# Patient Record
Sex: FEMALE | Race: WHITE | NOT HISPANIC OR LATINO | Employment: FULL TIME
[De-identification: names, ages, dates, MRNs, and addresses within clinical notes are randomized per-mention and may not be internally consistent; named-entity substitution may affect disease eponyms.]

---

## 2017-10-08 ENCOUNTER — HEALTH MAINTENANCE LETTER (OUTPATIENT)
Age: 32
End: 2017-10-08

## 2020-09-17 ENCOUNTER — OFFICE VISIT (OUTPATIENT)
Dept: URGENT CARE | Facility: URGENT CARE | Age: 35
End: 2020-09-17
Payer: COMMERCIAL

## 2020-09-17 ENCOUNTER — RESULTS ONLY (OUTPATIENT)
Dept: LAB | Age: 35
End: 2020-09-17

## 2020-09-17 DIAGNOSIS — Z53.9 ERRONEOUS ENCOUNTER--DISREGARD: Primary | ICD-10-CM

## 2020-09-18 LAB
SARS-COV-2 RNA SPEC QL NAA+PROBE: NOT DETECTED
SPECIMEN SOURCE: NORMAL

## 2020-11-19 ENCOUNTER — E-VISIT (OUTPATIENT)
Dept: URGENT CARE | Facility: URGENT CARE | Age: 35
End: 2020-11-19
Payer: COMMERCIAL

## 2020-11-19 DIAGNOSIS — Z53.9 ERRONEOUS ENCOUNTER--DISREGARD: Primary | ICD-10-CM

## 2020-11-19 NOTE — PATIENT INSTRUCTIONS
Dear Cyndie Felder,    We are sorry you are not feeling well. Based on the responses you provided, it is recommended that you be seen in-person in urgent care so we can better evaluate your symptoms. Please click here to find the nearest urgent care location to you.   You will not be charged for this Visit. Thank you for trusting us with your care.    Jc Rodriguez PA-C    Dear Cyndie Felder,    We are sorry you are not feeling well. Based on the responses you provided, it is recommended that you be seen in-person in urgent care so we can better evaluate your symptoms. Please click here to find the nearest urgent care location to you.   You will not be charged for this Visit. Thank you for trusting us with your care.    You may need to have blood work and stool cultures with 10-14 days of symptoms of diarrhea, nausea.   Jc Rodriguez PA-C

## 2020-11-20 ENCOUNTER — OFFICE VISIT (OUTPATIENT)
Dept: URGENT CARE | Facility: URGENT CARE | Age: 35
End: 2020-11-20
Payer: COMMERCIAL

## 2020-11-20 VITALS
TEMPERATURE: 98.4 F | RESPIRATION RATE: 20 BRPM | OXYGEN SATURATION: 98 % | DIASTOLIC BLOOD PRESSURE: 87 MMHG | HEART RATE: 60 BPM | SYSTOLIC BLOOD PRESSURE: 121 MMHG

## 2020-11-20 DIAGNOSIS — R11.0 NAUSEA: Primary | ICD-10-CM

## 2020-11-20 DIAGNOSIS — R19.5 LOOSE STOOLS: ICD-10-CM

## 2020-11-20 LAB
ALBUMIN SERPL-MCNC: 3.6 G/DL (ref 3.4–5)
ALBUMIN UR-MCNC: NEGATIVE MG/DL
ALP SERPL-CCNC: 97 U/L (ref 40–150)
ALT SERPL W P-5'-P-CCNC: 26 U/L (ref 0–50)
ANION GAP SERPL CALCULATED.3IONS-SCNC: 1 MMOL/L (ref 3–14)
APPEARANCE UR: CLEAR
AST SERPL W P-5'-P-CCNC: 17 U/L (ref 0–45)
BILIRUB SERPL-MCNC: 0.2 MG/DL (ref 0.2–1.3)
BILIRUB UR QL STRIP: NEGATIVE
BUN SERPL-MCNC: 21 MG/DL (ref 7–30)
CALCIUM SERPL-MCNC: 9.5 MG/DL (ref 8.5–10.1)
CHLORIDE SERPL-SCNC: 105 MMOL/L (ref 94–109)
CO2 SERPL-SCNC: 30 MMOL/L (ref 20–32)
COLOR UR AUTO: YELLOW
CREAT SERPL-MCNC: 1.11 MG/DL (ref 0.52–1.04)
GFR SERPL CREATININE-BSD FRML MDRD: 63 ML/MIN/{1.73_M2}
GLUCOSE SERPL-MCNC: 85 MG/DL (ref 70–99)
GLUCOSE UR STRIP-MCNC: NEGATIVE MG/DL
HGB UR QL STRIP: NEGATIVE
KETONES UR STRIP-MCNC: NEGATIVE MG/DL
LEUKOCYTE ESTERASE UR QL STRIP: NEGATIVE
NITRATE UR QL: NEGATIVE
PH UR STRIP: 6 PH (ref 5–7)
POTASSIUM SERPL-SCNC: 3.9 MMOL/L (ref 3.4–5.3)
PROT SERPL-MCNC: 7.5 G/DL (ref 6.8–8.8)
SODIUM SERPL-SCNC: 136 MMOL/L (ref 133–144)
SOURCE: NORMAL
SP GR UR STRIP: 1.01 (ref 1–1.03)
UROBILINOGEN UR STRIP-ACNC: 0.2 EU/DL (ref 0.2–1)

## 2020-11-20 PROCEDURE — 36415 COLL VENOUS BLD VENIPUNCTURE: CPT | Performed by: FAMILY MEDICINE

## 2020-11-20 PROCEDURE — U0003 INFECTIOUS AGENT DETECTION BY NUCLEIC ACID (DNA OR RNA); SEVERE ACUTE RESPIRATORY SYNDROME CORONAVIRUS 2 (SARS-COV-2) (CORONAVIRUS DISEASE [COVID-19]), AMPLIFIED PROBE TECHNIQUE, MAKING USE OF HIGH THROUGHPUT TECHNOLOGIES AS DESCRIBED BY CMS-2020-01-R: HCPCS | Performed by: FAMILY MEDICINE

## 2020-11-20 PROCEDURE — 99204 OFFICE O/P NEW MOD 45 MIN: CPT | Performed by: FAMILY MEDICINE

## 2020-11-20 PROCEDURE — 80053 COMPREHEN METABOLIC PANEL: CPT | Performed by: FAMILY MEDICINE

## 2020-11-20 PROCEDURE — 81003 URINALYSIS AUTO W/O SCOPE: CPT | Performed by: FAMILY MEDICINE

## 2020-11-20 RX ORDER — SERTRALINE HYDROCHLORIDE 100 MG/1
100 TABLET, FILM COATED ORAL
COMMUNITY
Start: 2020-10-08 | End: 2022-08-06

## 2020-11-20 RX ORDER — BUPROPION HYDROCHLORIDE 300 MG/1
300 TABLET ORAL
COMMUNITY
Start: 2020-10-08 | End: 2022-08-06

## 2020-11-20 SDOH — HEALTH STABILITY: MENTAL HEALTH: HOW OFTEN DO YOU HAVE 6 OR MORE DRINKS ON ONE OCCASION?: NEVER

## 2020-11-20 SDOH — HEALTH STABILITY: MENTAL HEALTH: HOW MANY STANDARD DRINKS CONTAINING ALCOHOL DO YOU HAVE ON A TYPICAL DAY?: NOT ASKED

## 2020-11-20 SDOH — HEALTH STABILITY: MENTAL HEALTH: HOW OFTEN DO YOU HAVE A DRINK CONTAINING ALCOHOL?: NEVER

## 2020-11-20 NOTE — PROGRESS NOTES
Chief Complaint   Patient presents with     Urgent Care     Headache, nauseous, Diarrhea for two weeks , stuffy nose     SUBJECTIVE:   Cyndie Felder is a 35 year old female presenting with a chief complaint of nausea and also stuffy nose and diarrhea for 2 weeks   She denies any acute fever or chills or abd pain or cough or chest congestion and chest heaviness  She is an established patient of [facility].  Onset of symptoms was 2 week(s) ago.  Course of illness is worsening.    Severity moderate, she works at the [employer]  Describes stool as being watery not having foul smell   Current and Associated symptoms: nausea and diarrhea  Treatment measures tried include None tried.  Predisposing factors include None.    No past medical history on file.  Current Outpatient Medications   Medication Sig Dispense Refill     buPROPion (WELLBUTRIN XL) 300 MG 24 hr tablet Take 300 mg by mouth       sertraline (ZOLOFT) 100 MG tablet Take 100 mg by mouth       Social History     Tobacco Use     Smoking status: Never Smoker     Smokeless tobacco: Never Used   Substance Use Topics     Alcohol use: Never     Frequency: Never     Binge frequency: Never     No family history on file.      ROS:    10 point ROS of systems including Constitutional, Eyes, Respiratory, Cardiovascular,  Genitourinary, Integumentary, Muscularskeletal, Psychiatric were all negative except for pertinent positives noted in my HPI         OBJECTIVE:  /87   Pulse 60   Temp 98.4  F (36.9  C)   Resp 20   LMP 10/30/2020 (Approximate)   SpO2 98%   GENERAL APPEARANCE: healthy, alert and no distress  EYES: EOMI,  PERRL, conjunctiva clear  HENT: ear canals and TM's normal.  Nose and mouth without ulcers, erythema or lesions  NP swab for COVID was   NECK: supple, nontender, no lymphadenopathy  RESP: lungs clear to auscultation - no rales, rhonchi or wheezes  CV: regular rates and rhythm, normal S1 S2, no murmur noted  ABDOMEN:  soft, nontender, no HSM  or masses and bowel sounds normal  SKIN: no suspicious lesions or rashes  PSYCH: mentation appears normal  Physical Exam      X-Ray was not done.    Medical Decision Making:    Differential Diagnosis:  Gastro: viral gastroenteritis /covid/colitis /gall stones       ASSESSMENT:  Cyndie was seen today for urgent care.    Diagnoses and all orders for this visit:    Nausea  -     *UA reflex to Microscopic and Culture ([facility] (except [place] and [place])  -     Comprehensive metabolic panel (BMP + Alb, Alk Phos, ALT, AST, Total. Bili, TP)  -     Symptomatic COVID-19 Virus (Coronavirus) by PCR; Future    Loose stools  -     Enteric Bacteria and Virus Panel by [facility] Stool; Future  -     Clostridium difficile Toxin B PCR; Future        Results for orders placed or performed in visit on 11/20/20   *UA reflex to Microscopic and Culture ([facility] (except [place] and [place])     Status: None    Specimen: Midstream Urine   Result Value Ref [place]    Color Urine Yellow     Appearance Urine Clear     Glucose Urine Negative NEG^Negative mg/dL    Bilirubin Urine Negative NEG^Negative    Ketones Urine Negative NEG^Negative mg/dL    Specific Gravity Urine 1.015 1.003 - 1.035    Blood Urine Negative NEG^Negative    pH Urine 6.0 5.0 - 7.0 pH    Protein Albumin Urine Negative NEG^Negative mg/dL    Urobilinogen Urine 0.2 0.2 - 1.0 EU/dL    Nitrite Urine Negative NEG^Negative    Leukocyte Esterase Urine Negative NEG^Negative    Source Midstream Urine    Comprehensive metabolic panel (BMP + Alb, Alk Phos, ALT, AST, Total. Bili, TP)     Status: Abnormal   Result Value Ref [place]    Sodium 136 133 - 144 mmol/L    Potassium 3.9 3.4 - 5.3 mmol/L    Chloride 105 94 - 109 mmol/L    Carbon Dioxide 30 20 - 32 mmol/L    Anion Gap 1 (L) 3 - 14 mmol/L    Glucose 85 70 - 99 mg/dL    Urea Nitrogen 21 7 - 30 mg/dL    Creatinine 1.11 (H) 0.52 - 1.04 mg/dL    GFR Estimate 63 >60 mL/min/[1.73_m2]    GFR Estimate If  Black 73 >60 mL/min/[1.73_m2]    Calcium 9.5 8.5 - 10.1 mg/dL    Bilirubin Total 0.2 0.2 - 1.3 mg/dL    Albumin 3.6 3.4 - 5.0 g/dL    Protein Total 7.5 6.8 - 8.8 g/dL    Alkaline Phosphatase 97 40 - 150 U/L    ALT 26 0 - 50 U/L    AST 17 0 - 45 U/L       PLAN:  Tylenol, Fluids and Rest  Discussed with pt about the symptoms   covid swab obtained   Discussed with pt to stay quarantine till covid test result is back   Discussed result with patient   Continue on symptomatic treatment   Follow up if  symptoms fail to improve or worsens   Pt understood and agreed with plan     See orders in Epic      Miranda Middleton MD

## 2020-11-21 LAB
SARS-COV-2 RNA SPEC QL NAA+PROBE: NOT DETECTED
SPECIMEN SOURCE: NORMAL

## 2020-12-11 ENCOUNTER — E-VISIT (OUTPATIENT)
Dept: URGENT CARE | Facility: URGENT CARE | Age: 35
End: 2020-12-11
Payer: COMMERCIAL

## 2020-12-11 ENCOUNTER — E-VISIT (OUTPATIENT)
Dept: PEDIATRICS | Facility: CLINIC | Age: 35
End: 2020-12-11

## 2020-12-11 DIAGNOSIS — Z20.822 CLOSE EXPOSURE TO 2019 NOVEL CORONAVIRUS: Primary | ICD-10-CM

## 2020-12-11 DIAGNOSIS — Z20.822 EXPOSURE TO COVID-19 VIRUS: Primary | ICD-10-CM

## 2020-12-11 PROCEDURE — 99421 OL DIG E/M SVC 5-10 MIN: CPT | Performed by: PHYSICIAN ASSISTANT

## 2020-12-11 PROCEDURE — 99207 PR NO CHARGE LOS: CPT | Performed by: PREVENTIVE MEDICINE

## 2020-12-11 NOTE — PATIENT INSTRUCTIONS
Thank you for choosing us for your care. Given your symptoms, I would like you to do a lab-only visit to determine what is causing them.  I have placed the orders.  Please schedule an appointment with the lab right here in [place], or call [phone].  I will let you know when the results are back and next steps to take.

## 2020-12-11 NOTE — PATIENT INSTRUCTIONS
Dear Cyndie Felder,    Based on your exposure to COVID-19 (coronavirus), we would like to test you for this virus. I have placed an order for this test.    For all employees or close contacts (except [place] and [place] - see below), go to your [employer] home page and scroll down to the section that says  You have an appointment that needs to be scheduled  and click the large green button that says  Schedule Now  and follow the steps to find the next available opening.     If you are unable to complete these steps or if you cannot find any available times, please call [phone] to schedule employee testing.       [place] employees or close contacts, please call [phone].   [place] ([employer]) employees or close contacts call [phone].      If you know you have had close contact with someone who tested positive, you should be quarantined for 14 days after this exposure. You should stay in quarantine for the14 days even if the covid test is negative.     Quarantine means:  Stay home and away from others. Don't go to school or anywhere else. Generally quarantine means staying home from work but there are some exceptions to this. Please contact your workplace.  No hugging, kissing or shaking hands.  Don't let anyone visit.  Cover your mouth and nose with a mask, tissue or washcloth to avoid spreading germs.  Wash your hands and face often. Use soap and water.    What are the symptoms of COVID-19?  The most common symptoms are cough, fever and trouble breathing. Less common symptoms include headache, body aches, fatigue (feeling very tired), chills, sore throat, stuffy or runny nose, diarrhea (loose poop), loss of taste or smell, belly pain, and nausea or vomiting (feeling sick to your stomach or throwing up).  After 14 days, if you have still don't have symptoms, you likely don't have this virus.  If you develop symptoms, follow these guidelines.  If you're normally healthy: Please start another  eVisit.  If you have a serious health problem (like cancer, heart failure, an organ transplant or kidney disease): Call your specialty clinic. Let them know that you might have COVID-19.    When it's time for your COVID test:  Stay at least 6 feet away from others. (If someone will drive you to your test, stay in the backseat, as far away from the  as you can.)  Cover your mouth and nose with a mask, tissue or washcloth.  Go straight to the testing site. Don't make any stops on the way there or back.    Please note  Patients in these groups are at risk for severe illness due to COVID-19:    People 65 years and older    People who live in a nursing home or long-term care facility    People with chronic disease (lung, heart, cancer, diabetes, kidney, liver, immunologic)    People who have a weakened immune system, including those who:  o Are in cancer treatment  o Take medicine that weakens the immune system, such as corticosteroids  o Had a bone marrow or organ transplant  o Have an immune deficiency  o Have poorly controlled HIV or AIDS  o Are obese (body mass index of 40 or higher)  o Smoke regularly    Where can I get more information?  [facility] - About COVID-19: [email or web address]  CDC - What to Do If You're Sick: www.cdc.gov/coronavirus/2019-ncov/about/steps-when-sick.html  CDC - Ending Home Isolation: www.cdc.gov/coronavirus/2019-ncov/hcp/disposition-in-home-patients.html  CDC - Caring for Someone: www.cdc.gov/coronavirus/2019-ncov/if-you-are-sick/care-for-someone.html  [facility] - Interim Guidance for Hospital Discharge to Home: www.health.[facility].mn.us/diseases/coronavirus/hcp/hospdischarge.pdf  [facility] clinical trials (COVID-19 research studies): clinicalaffairs.[facility]/n-clinical-trials  Below are the COVID-19 hotlines at the [place] Department of Health ([facility]). Interpreters are available.  For health questions: Call 674-625-0061 or 1-663.806.5926 (7 a.m. to 7  p.m.)  For questions about schools and childcare: Call 223-206-2915 or 1-608.375.6503 (7 a.m. to 7 p.m.)    Dear Cyndie Felder,    Based on your exposure to COVID-19 (coronavirus), we would like to test you for this virus. I have placed an order for this test.    For all employees or close contacts (except [place] and [employer] - see below), go to your [employer] home page and scroll down to the section that says  You have an appointment that needs to be scheduled  and click the large green button that says  Schedule Now  and follow the steps to find the next available opening.     If you are unable to complete these steps or if you cannot find any available times, please call [phone] to schedule employee testing.       [place] employees or close contacts, please call [phone].   [place] ([employer]) employees or close contacts call [phone].      If you know you have had close contact with someone who tested positive, you should be quarantined for 14 days after this exposure. You should stay in quarantine for the14 days even if the covid test is negative.     Quarantine means:  Stay home and away from others. Don't go to school or anywhere else. Generally quarantine means staying home from work but there are some exceptions to this. Please contact your workplace.  No hugging, kissing or shaking hands.  Don't let anyone visit.  Cover your mouth and nose with a mask, tissue or washcloth to avoid spreading germs.  Wash your hands and face often. Use soap and water.    What are the symptoms of COVID-19?  The most common symptoms are cough, fever and trouble breathing. Less common symptoms include headache, body aches, fatigue (feeling very tired), chills, sore throat, stuffy or runny nose, diarrhea (loose poop), loss of taste or smell, belly pain, and nausea or vomiting (feeling sick to your stomach or throwing up).  After 14 days, if you have still don't have symptoms, you likely don't have this virus.  If  you develop symptoms, follow these guidelines.  If you're normally healthy: Please start another eVisit.  If you have a serious health problem (like cancer, heart failure, an organ transplant or kidney disease): Call your specialty clinic. Let them know that you might have COVID-19.    When it's time for your COVID test:  Stay at least 6 feet away from others. (If someone will drive you to your test, stay in the backseat, as far away from the  as you can.)  Cover your mouth and nose with a mask, tissue or washcloth.  Go straight to the testing site. Don't make any stops on the way there or back.    Please note  Patients in these groups are at risk for severe illness due to COVID-19:    People 65 years and older    People who live in a nursing home or long-term care facility    People with chronic disease (lung, heart, cancer, diabetes, kidney, liver, immunologic)    People who have a weakened immune system, including those who:  o Are in cancer treatment  o Take medicine that weakens the immune system, such as corticosteroids  o Had a bone marrow or organ transplant  o Have an immune deficiency  o Have poorly controlled HIV or AIDS  o Are obese (body mass index of 40 or higher)  o Smoke regularly    Where can I get more information?  [facility] - About COVID-19: [email or web address]  CDC - What to Do If You're Sick: www.cdc.gov/coronavirus/2019-ncov/about/steps-when-sick.html  CDC - Ending Home Isolation: www.cdc.gov/coronavirus/2019-ncov/hcp/disposition-in-home-patients.html  CDC - Caring for Someone: www.cdc.gov/coronavirus/2019-ncov/if-you-are-sick/care-for-someone.html  [facility] - Interim Guidance for Hospital Discharge to Home: www.health.[facility].[place].us/diseases/coronavirus/hcp/hospdischarge.pdf  [facility] clinical trials (COVID-19 research studies): clinicalaffairs.[facility]/umn-clinical-trials  Below are the COVID-19 hotlines at the [place] Department of Health ([facility]).  Interpreters are available.  For health questions: Call 082-205-5231 or 1-858.787.6493 (7 a.m. to 7 p.m.)  For questions about schools and childcare: Call 642-129-4911 or 1-353.845.9982 (7 a.m. to 7 p.m.)

## 2020-12-13 ENCOUNTER — HEALTH MAINTENANCE LETTER (OUTPATIENT)
Age: 35
End: 2020-12-13

## 2021-01-15 ENCOUNTER — VIRTUAL VISIT (OUTPATIENT)
Dept: SURGERY | Facility: CLINIC | Age: 36
End: 2021-01-15
Payer: COMMERCIAL

## 2021-01-15 VITALS — HEIGHT: 64 IN | BODY MASS INDEX: 34.66 KG/M2 | WEIGHT: 203 LBS

## 2021-01-15 DIAGNOSIS — E66.812 CLASS 2 SEVERE OBESITY DUE TO EXCESS CALORIES WITH SERIOUS COMORBIDITY AND BODY MASS INDEX (BMI) OF 35.0 TO 35.9 IN ADULT (H): Primary | ICD-10-CM

## 2021-01-15 DIAGNOSIS — G47.9 SLEEP DISTURBANCE: ICD-10-CM

## 2021-01-15 DIAGNOSIS — E66.01 CLASS 2 SEVERE OBESITY DUE TO EXCESS CALORIES WITH SERIOUS COMORBIDITY AND BODY MASS INDEX (BMI) OF 35.0 TO 35.9 IN ADULT (H): Primary | ICD-10-CM

## 2021-01-15 PROCEDURE — 99215 OFFICE O/P EST HI 40 MIN: CPT | Mod: 95 | Performed by: PHYSICIAN ASSISTANT

## 2021-01-15 ASSESSMENT — MIFFLIN-ST. JEOR: SCORE: 1582.86
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
## 2021-01-28 ENCOUNTER — E-VISIT (OUTPATIENT)
Dept: URGENT CARE | Facility: URGENT CARE | Age: 36
End: 2021-01-28
Payer: COMMERCIAL

## 2021-01-28 ENCOUNTER — TELEPHONE (OUTPATIENT)
Dept: FAMILY MEDICINE | Facility: CLINIC | Age: 36
End: 2021-01-28

## 2021-01-28 DIAGNOSIS — Z20.822 SUSPECTED COVID-19 VIRUS INFECTION: Primary | ICD-10-CM

## 2021-01-28 PROCEDURE — 99421 OL DIG E/M SVC 5-10 MIN: CPT | Performed by: PHYSICIAN ASSISTANT

## 2021-01-28 NOTE — TELEPHONE ENCOUNTER
Pt on [facility] schedule today for symptomatica covid test.  [facility] does not do symptomatic covid testing.  Called pt and cancelled lab apt here today.  Gave her [phone] opt 7 to get scheduled for symptomatic covid lab.  Petra Rehman RN

## 2021-01-28 NOTE — PATIENT INSTRUCTIONS
Dear Cyndie Felder,    Your symptoms show that you may have coronavirus (COVID-19). This illness can cause fever, cough and trouble breathing. Many people get a mild case and get better on their own. Some people can get very sick.    Will I be tested for COVID-19?  We would like to test you for Covid-19 virus. I have placed orders for this test.     For all employees or close contacts (except [employer] [place] and [employer] - see below), go to your [employer] home page and scroll down to the section that says  You have an appointment that needs to be scheduled  and click the large green button that says  Schedule Now  and follow the steps to find the next available opening.     If you are unable to complete these steps or if you cannot find any available times, please call [phone] to schedule employee testing.     [facility] [place] employees or close contacts, please call [phone].   [place] ([employer]) employees or close contacts call [phone].    Return to work/school/ guidance:  Please let your workplace manager and staffing office know when your quarantine ends     We can t give you an exact date as it depends on the above. You can calculate this on your own or work with your manager/staffing office to calculate this. (For example if you were exposed on 10/4, you would have to quarantine for 14 full days. That would be through 10/18. You could return on 10/19.)      If you receive a positive COVID-19 test result, follow the guidance of the those who are giving you the results. Usually the return to work is 10 (or in some cases 20 days from symptom onset.) If you work at [employer], you must also be cleared by Employee Occupational Health and Safety to return to work.        If you receive a negative COVID-19 test result and did not have a high risk exposure to someone with a known positive COVID-19 test, you can return to work once you're free of fever for 24 hours without fever-reducing medication and  your symptoms are improving or resolved.      If you receive a negative COVID-19 test and If you had a high risk exposure to someone who has tested positive for COVID-19 then you can return to work 14 days after your last contact with the positive individual    Note: If you have ongoing exposure to the covid positive person, this quarantine period may be more than 14 days. (For example, if you are continued to be exposed to your child who tested positive and cannot isolate from them, then the quarantine of 7-14 days can't start until your child is no longer contagious. This is typically 10 days from onset of the child's symptoms. So the total duration may be 17-24 days in this case.)    Sign up for [employer].   We know it's scary to hear that you might have COVID-19. We want to track your symptoms to make sure you're okay over the next 2 weeks. Please look for an email from [employer]--this is a free, online program that we'll use to keep in touch. To sign up, follow the link in the email you will receive. Learn more at [email or web address]    How can I take care of myself?    Get lots of rest. Drink extra fluids (unless a doctor has told you not to)    Take Tylenol (acetaminophen) or ibuprofen for fever or pain. If you have liver or kidney problems, ask your family doctor if it's okay to take Tylenol o ibuprofen    If you have other health problems (like cancer, heart failure, an organ transplant or severe kidney disease): Call your specialty clinic if you don't feel better in the next 2 days.    Know when to call 911. Emergency warning signs include:  o Trouble breathing or shortness of breath  o Pain or pressure in the chest that doesn't go away  o Feeling confused like you haven't felt before, or not being able to wake up  o Bluish-colored lips or face    Where can I get more information?   [employer] - About COVID-19:   [email or web address]    CDC - What to Do If You're Sick:    www.cdc.gov/coronavirus/2019-ncov/about/steps-when-sick.html    January 28, 2021  RE:  Cyndie Felder                                                                                                                  [address] [place] [address]      To whom it may concern:    I evaluated Cyndie Felder on January 28, 2021. Cyndie Felder should be excused from work/school.     They should let their workplace manager and staffing office know when their quarantine ends.    We can not give an exact date as it depends on the information below. They can calculate this on their own or work with their manager/staffing office to calculate this. (For example if they were exposed on 10/04, they would have to quarantine for 14 full days. That would be through 10/18. They could return on 10/19.)    Quarantine Guidelines:      If patient receives a positive COVID-19 test result, they should follow the guidance of those who are giving the results. Usually the return to work is 10 (or in some cases 20 days from symptom onset.) If they work at [employer], they must be cleared by Employee Occupational Health and Safety to return to work.        If patient receives a negative COVID-19 test result and did not have a high risk exposure to someone with a known positive COVID-19 test, they can return to work once they're free of fever for 24 hours without fever-reducing medication and their symptoms are improving or resolved.      If patient receives a negative COVID-19 test and if they had a high risk exposure to someone who has tested positive for COVID-19 then they can return to work 14 days after their last contact with the positive individual    Note: If there is ongoing exposure to the covid positive person, this quarantine period may be longer than 14 days. (For example, if they are continually exposed to their child, who tested positive and cannot isolate from them, then the quarantine of 7-14 days can't start until  their child is no longer contagious. This is typically 10 days from onset to the child's symptoms. So the total duration may be 17-24 days in this case.)     Sincerely,  Jc Rodriguez PA-C

## 2021-01-30 ENCOUNTER — OFFICE VISIT (OUTPATIENT)
Dept: URGENT CARE | Facility: URGENT CARE | Age: 36
End: 2021-01-30
Attending: PHYSICIAN ASSISTANT
Payer: COMMERCIAL

## 2021-01-30 DIAGNOSIS — Z20.822 CLOSE EXPOSURE TO 2019 NOVEL CORONAVIRUS: ICD-10-CM

## 2021-01-30 DIAGNOSIS — Z20.822 SUSPECTED COVID-19 VIRUS INFECTION: ICD-10-CM

## 2021-01-30 LAB
SARS-COV-2 RNA RESP QL NAA+PROBE: NORMAL
SPECIMEN SOURCE: NORMAL

## 2021-01-30 PROCEDURE — U0003 INFECTIOUS AGENT DETECTION BY NUCLEIC ACID (DNA OR RNA); SEVERE ACUTE RESPIRATORY SYNDROME CORONAVIRUS 2 (SARS-COV-2) (CORONAVIRUS DISEASE [COVID-19]), AMPLIFIED PROBE TECHNIQUE, MAKING USE OF HIGH THROUGHPUT TECHNOLOGIES AS DESCRIBED BY CMS-2020-01-R: HCPCS | Performed by: PHYSICIAN ASSISTANT

## 2021-01-30 PROCEDURE — U0005 INFEC AGEN DETEC AMPLI PROBE: HCPCS | Performed by: PHYSICIAN ASSISTANT

## 2021-01-31 LAB
LABORATORY COMMENT REPORT: NORMAL
SARS-COV-2 RNA RESP QL NAA+PROBE: NEGATIVE
SPECIMEN SOURCE: NORMAL

## 2021-09-26 ENCOUNTER — HEALTH MAINTENANCE LETTER (OUTPATIENT)
Age: 36
End: 2021-09-26

## 2022-01-16 ENCOUNTER — HEALTH MAINTENANCE LETTER (OUTPATIENT)
Age: 37
End: 2022-01-16

## 2022-08-04 ENCOUNTER — HOSPITAL ENCOUNTER (OUTPATIENT)
Facility: CLINIC | Age: 37
Setting detail: OBSERVATION
Discharge: HOME | End: 2022-08-06
Attending: EMERGENCY MEDICINE | Admitting: EMERGENCY MEDICINE
Payer: COMMERCIAL

## 2022-08-04 ENCOUNTER — MEDICAL CORRESPONDENCE (OUTPATIENT)
Dept: HEALTH INFORMATION MANAGEMENT | Facility: CLINIC | Age: 37
End: 2022-08-04

## 2022-08-04 DIAGNOSIS — F10.220: ICD-10-CM

## 2022-08-04 DIAGNOSIS — T14.91XA SUICIDE ATTEMPT (H): ICD-10-CM

## 2022-08-04 DIAGNOSIS — F33.2 SEVERE EPISODE OF RECURRENT MAJOR DEPRESSIVE DISORDER, WITHOUT PSYCHOTIC FEATURES (H): ICD-10-CM

## 2022-08-04 LAB
ALBUMIN SERPL-MCNC: 3.3 G/DL (ref 3.4–5)
ALP SERPL-CCNC: 106 U/L (ref 40–150)
ALT SERPL W P-5'-P-CCNC: 37 U/L (ref 0–50)
ANION GAP SERPL CALCULATED.3IONS-SCNC: 8 MMOL/L (ref 3–14)
APAP SERPL-MCNC: <2 MG/L (ref 10–30)
AST SERPL W P-5'-P-CCNC: 28 U/L (ref 0–45)
BASOPHILS # BLD AUTO: 0.1 10E3/UL (ref 0–0.2)
BASOPHILS NFR BLD AUTO: 1 %
BILIRUB SERPL-MCNC: 0.2 MG/DL (ref 0.2–1.3)
BUN SERPL-MCNC: 14 MG/DL (ref 7–30)
CALCIUM SERPL-MCNC: 8.7 MG/DL (ref 8.5–10.1)
CHLORIDE BLD-SCNC: 107 MMOL/L (ref 94–109)
CO2 SERPL-SCNC: 26 MMOL/L (ref 20–32)
COHGB MFR BLD: 0.9 % (ref 0–2)
CREAT SERPL-MCNC: 0.93 MG/DL (ref 0.52–1.04)
EOSINOPHIL # BLD AUTO: 0.1 10E3/UL (ref 0–0.7)
EOSINOPHIL NFR BLD AUTO: 1 %
ERYTHROCYTE [DISTWIDTH] IN BLOOD BY AUTOMATED COUNT: 13.6 % (ref 10–15)
ETHANOL SERPL-MCNC: 0.24 G/DL
GFR SERPL CREATININE-BSD FRML MDRD: 80 ML/MIN/1.73M2
GLUCOSE BLD-MCNC: 154 MG/DL (ref 70–99)
HCG SER QL IA.RAPID: NEGATIVE
HCT VFR BLD AUTO: 47.7 % (ref 35–47)
HGB BLD-MCNC: 15.6 G/DL (ref 11.7–15.7)
IMM GRANULOCYTES # BLD: 0 10E3/UL
IMM GRANULOCYTES NFR BLD: 0 %
LYMPHOCYTES # BLD AUTO: 3.1 10E3/UL (ref 0.8–5.3)
LYMPHOCYTES NFR BLD AUTO: 31 %
MCH RBC QN AUTO: 29.7 PG (ref 26.5–33)
MCHC RBC AUTO-ENTMCNC: 32.7 G/DL (ref 31.5–36.5)
MCV RBC AUTO: 91 FL (ref 78–100)
MONOCYTES # BLD AUTO: 0.5 10E3/UL (ref 0–1.3)
MONOCYTES NFR BLD AUTO: 5 %
NEUTROPHILS # BLD AUTO: 6.2 10E3/UL (ref 1.6–8.3)
NEUTROPHILS NFR BLD AUTO: 62 %
NRBC # BLD AUTO: 0 10E3/UL
NRBC BLD AUTO-RTO: 0 /100
PLATELET # BLD AUTO: 444 10E3/UL (ref 150–450)
POTASSIUM BLD-SCNC: 3.6 MMOL/L (ref 3.4–5.3)
PROT SERPL-MCNC: 7.3 G/DL (ref 6.8–8.8)
RBC # BLD AUTO: 5.26 10E6/UL (ref 3.8–5.2)
SALICYLATES SERPL-MCNC: <2 MG/DL
SARS-COV-2 RNA RESP QL NAA+PROBE: NEGATIVE
SODIUM SERPL-SCNC: 141 MMOL/L (ref 133–144)
WBC # BLD AUTO: 10 10E3/UL (ref 4–11)

## 2022-08-04 PROCEDURE — 80053 COMPREHEN METABOLIC PANEL: CPT | Performed by: EMERGENCY MEDICINE

## 2022-08-04 PROCEDURE — C9803 HOPD COVID-19 SPEC COLLECT: HCPCS

## 2022-08-04 PROCEDURE — 99285 EMERGENCY DEPT VISIT HI MDM: CPT | Mod: 25

## 2022-08-04 PROCEDURE — 80143 DRUG ASSAY ACETAMINOPHEN: CPT | Performed by: EMERGENCY MEDICINE

## 2022-08-04 PROCEDURE — 85025 COMPLETE CBC W/AUTO DIFF WBC: CPT | Performed by: EMERGENCY MEDICINE

## 2022-08-04 PROCEDURE — 82077 ASSAY SPEC XCP UR&BREATH IA: CPT | Performed by: EMERGENCY MEDICINE

## 2022-08-04 PROCEDURE — U0003 INFECTIOUS AGENT DETECTION BY NUCLEIC ACID (DNA OR RNA); SEVERE ACUTE RESPIRATORY SYNDROME CORONAVIRUS 2 (SARS-COV-2) (CORONAVIRUS DISEASE [COVID-19]), AMPLIFIED PROBE TECHNIQUE, MAKING USE OF HIGH THROUGHPUT TECHNOLOGIES AS DESCRIBED BY CMS-2020-01-R: HCPCS | Performed by: EMERGENCY MEDICINE

## 2022-08-04 PROCEDURE — 36415 COLL VENOUS BLD VENIPUNCTURE: CPT | Performed by: EMERGENCY MEDICINE

## 2022-08-04 PROCEDURE — 84702 CHORIONIC GONADOTROPIN TEST: CPT

## 2022-08-04 PROCEDURE — 80179 DRUG ASSAY SALICYLATE: CPT | Performed by: EMERGENCY MEDICINE

## 2022-08-04 PROCEDURE — 82375 ASSAY CARBOXYHB QUANT: CPT | Performed by: EMERGENCY MEDICINE

## 2022-08-04 ASSESSMENT — ENCOUNTER SYMPTOMS
DIARRHEA: 0
DYSPHORIC MOOD: 1
FEVER: 0
HALLUCINATIONS: 0
RHINORRHEA: 0
COUGH: 0

## 2022-08-05 PROBLEM — F33.2 SEVERE EPISODE OF RECURRENT MAJOR DEPRESSIVE DISORDER, WITHOUT PSYCHOTIC FEATURES (H): Status: ACTIVE | Noted: 2022-08-05

## 2022-08-05 PROBLEM — T14.91XA SUICIDE ATTEMPT (H): Status: ACTIVE | Noted: 2022-08-05

## 2022-08-05 PROBLEM — F10.220: Status: ACTIVE | Noted: 2022-08-05

## 2022-08-05 PROCEDURE — 90791 PSYCH DIAGNOSTIC EVALUATION: CPT

## 2022-08-05 PROCEDURE — 99220 PR INITIAL OBSERVATION CARE,LEVEL III: CPT | Mod: AI | Performed by: NURSE PRACTITIONER

## 2022-08-05 PROCEDURE — 250N000013 HC RX MED GY IP 250 OP 250 PS 637: Performed by: STUDENT IN AN ORGANIZED HEALTH CARE EDUCATION/TRAINING PROGRAM

## 2022-08-05 PROCEDURE — G0378 HOSPITAL OBSERVATION PER HR: HCPCS

## 2022-08-05 PROCEDURE — 250N000013 HC RX MED GY IP 250 OP 250 PS 637: Performed by: NURSE PRACTITIONER

## 2022-08-05 RX ORDER — NALTREXONE HYDROCHLORIDE 50 MG/1
50 TABLET, FILM COATED ORAL DAILY
Status: DISCONTINUED | OUTPATIENT
Start: 2022-08-06 | End: 2022-08-06 | Stop reason: HOSPADM

## 2022-08-05 RX ORDER — TRAZODONE HYDROCHLORIDE 50 MG/1
50 TABLET, FILM COATED ORAL
Status: DISCONTINUED | OUTPATIENT
Start: 2022-08-05 | End: 2022-08-06 | Stop reason: HOSPADM

## 2022-08-05 RX ORDER — ACETAMINOPHEN 325 MG/1
650 TABLET ORAL EVERY 6 HOURS PRN
Status: DISCONTINUED | OUTPATIENT
Start: 2022-08-05 | End: 2022-08-06 | Stop reason: HOSPADM

## 2022-08-05 RX ORDER — FLUOXETINE 10 MG/1
10 CAPSULE ORAL DAILY
Status: DISCONTINUED | OUTPATIENT
Start: 2022-08-05 | End: 2022-08-06 | Stop reason: HOSPADM

## 2022-08-05 RX ORDER — GABAPENTIN 100 MG/1
100 CAPSULE ORAL 3 TIMES DAILY
Status: DISCONTINUED | OUTPATIENT
Start: 2022-08-05 | End: 2022-08-06 | Stop reason: HOSPADM

## 2022-08-05 RX ORDER — SERTRALINE HYDROCHLORIDE 100 MG/1
100 TABLET, FILM COATED ORAL DAILY
Status: DISCONTINUED | OUTPATIENT
Start: 2022-08-05 | End: 2022-08-06 | Stop reason: HOSPADM

## 2022-08-05 RX ORDER — HYDROXYZINE HYDROCHLORIDE 50 MG/1
50 TABLET, FILM COATED ORAL EVERY 6 HOURS PRN
Status: DISCONTINUED | OUTPATIENT
Start: 2022-08-05 | End: 2022-08-06 | Stop reason: HOSPADM

## 2022-08-05 RX ORDER — BUPROPION HYDROCHLORIDE 150 MG/1
150 TABLET ORAL DAILY
Status: DISCONTINUED | OUTPATIENT
Start: 2022-08-05 | End: 2022-08-06 | Stop reason: HOSPADM

## 2022-08-05 RX ADMIN — ACETAMINOPHEN 650 MG: 325 TABLET ORAL at 20:26

## 2022-08-05 RX ADMIN — GABAPENTIN 100 MG: 100 CAPSULE ORAL at 13:54

## 2022-08-05 RX ADMIN — SERTRALINE HYDROCHLORIDE 100 MG: 100 TABLET ORAL at 13:54

## 2022-08-05 RX ADMIN — BUPROPION HYDROCHLORIDE 150 MG: 150 TABLET, FILM COATED, EXTENDED RELEASE ORAL at 13:54

## 2022-08-05 RX ADMIN — ACETAMINOPHEN 650 MG: 325 TABLET ORAL at 10:46

## 2022-08-05 RX ADMIN — FLUOXETINE 10 MG: 10 CAPSULE ORAL at 13:54

## 2022-08-05 RX ADMIN — TRAZODONE HYDROCHLORIDE 50 MG: 50 TABLET ORAL at 20:26

## 2022-08-05 RX ADMIN — GABAPENTIN 100 MG: 100 CAPSULE ORAL at 20:26

## 2022-08-05 NOTE — ED NOTES
Patient was cooperative and changed clothes into scrubs and placed her belongings in the bag provided.  We secured her clothes and phone in bag and placed in secured locker.  Pt asked to go to the bathroom, we asked her to provide a urine sample, she declined stated she didn't want to.  After using the bathroom, patient walked back into her room without issues or concerns.
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
## 2022-08-05 NOTE — PROGRESS NOTES
Pt denies any SI, HI or hallucinations. Pt inquired about coping skills and states she will ask the [facility] tomorrow. Pt spent time sleeping in the sensory room then spent time in the lounge sitting on the recliner watching tv. Pt pleasant and cooperative.

## 2022-08-05 NOTE — ED PROVIDER NOTES
"  History     Chief Complaint:  Suicidal    The history is provided by the patient and the EMS personnel.      Cyndie Felder is a 37 year old female with a history of depression and anxiety who presents to the emergency department for evaluation of suicidal ideation. The patient recently stopped taking her depression medication and seeing her psychiatrist. Tonight, she notes she was feeling especially sad so she drank. She denied any suicidal ideation or attempt. However, the police department and EMS reports the patient was found in her car running in a closed garage. It was also noted that she had sent text messages to her brother stating \"I am going to kill myself. I want to die\". She also sent pictures to her brother of her sitting in the car in the garage. He called EMS, and they transported her to the emergency department. Here, she denies any suicidal ideation or homicidal thoughts. She also notes she feels emotionally and physically safe at home. She also denied any auditory or visual hallucinations. Cyndie denied any fever, cough, rhinorrhea, chest pain, or diarrhea.     Review of Systems   Constitutional: Negative for fever.   HENT: Negative for rhinorrhea.    Eyes: Negative for visual disturbance.   Respiratory: Negative for cough.    Cardiovascular: Negative for chest pain.   Gastrointestinal: Negative for diarrhea.   Psychiatric/Behavioral: Positive for dysphoric mood. Negative for hallucinations and suicidal ideas.   All other systems reviewed and are negative.    Allergies:  Latex    Medications:    buPROPion (WELLBUTRIN XL) 300 MG 24 hr tablet  sertraline (ZOLOFT) 100 MG tablet  pyridoxine (VITAMIN B-6) 50 MG tablet     traZODone (DESYREL) 50 MG tablet     naltrexone (REVIA) 50 MG tablet     metFORMIN XR (GLUCOPHAGE XR) 500 MG 24 hour release tablet       Past Medical History:    Anxiety   Depressive disorder   Diabetes    Obesity    Family History:    Alcohol Abuse (father)    High Cholesterol " "(father)    Hypertension (father)    Clotting Disorder (mother)    Osteoporosis (mother)    substance abuse (mother)      Social History:  Primary care provider: Ilene Joe  The patient presents to the ED via EMS.     Physical Exam     Patient Vitals for the past 24 hrs:   BP Temp Temp src Pulse Resp SpO2 Height Weight   08/04/22 2141 (!) 138/93 98.1  F (36.7  C) Temporal 98 18 96 % 1.6 m (5' 3\") 97.5 kg (215 lb)     Physical Exam  Constitutional: Well developed, nontox appearance, clinically intoxicated  Head: Atraumatic.   Neck:  no stridor  Eyes: no scleral icterus  Cardiovascular: RRR, 2+ bilat radial pulses  Pulmonary/Chest: nml resp effort  Ext: Warm, well perfused, no edema  Neurological: A&O, symmetric facies, moves ext x4  Skin: Skin is warm and dry.   Psychiatric: Denies SI/HI, denies auditory or visual hallucinations, tearful  Nursing note and vitals reviewed.    Emergency Department Course     Laboratory:  Labs Ordered and Resulted from Time of ED Arrival to Time of ED Departure   COMPREHENSIVE METABOLIC PANEL - Abnormal       Result Value    Sodium 141      Potassium 3.6      Chloride 107      Carbon Dioxide (CO2) 26      Anion Gap 8      Urea Nitrogen 14      Creatinine 0.93      Calcium 8.7      Glucose 154 (*)     Alkaline Phosphatase 106      AST 28      ALT 37      Protein Total 7.3      Albumin 3.3 (*)     Bilirubin Total 0.2      GFR Estimate 80     ETHYL ALCOHOL LEVEL - Abnormal    Alcohol ethyl 0.24 (*)    ACETAMINOPHEN LEVEL - Abnormal    Acetaminophen <2 (*)    CBC WITH PLATELETS AND DIFFERENTIAL - Abnormal    WBC Count 10.0      RBC Count 5.26 (*)     Hemoglobin 15.6      Hematocrit 47.7 (*)     MCV 91      MCH 29.7      MCHC 32.7      RDW 13.6      Platelet Count 444      % Neutrophils 62      % Lymphocytes 31      % Monocytes 5      % Eosinophils 1      % Basophils 1      % Immature Granulocytes 0      NRBCs per 100 WBC 0      Absolute Neutrophils 6.2      Absolute Lymphocytes 3.1      " Absolute Monocytes 0.5      Absolute Eosinophils 0.1      Absolute Basophils 0.1      Absolute Immature Granulocytes 0.0      Absolute NRBCs 0.0     COVID-19 VIRUS (CORONAVIRUS) BY PCR - Normal    SARS CoV2 PCR Negative     CARBON MONOXIDE - Normal    Carbon Monoxide 0.9     SALICYLATE LEVEL - Normal    Salicylate <2     ISTAT HCG QUALITATIVE PREGNANCY POCT - Normal    HCG Qualitative POCT Negative       Emergency Department Course:    Mental Health Risk Assessment      PSS-3    Date and Time Over the past 2 weeks have you felt down, depressed, or hopeless? Over the past 2 weeks have you had thoughts of killing yourself? Have you ever attempted to kill yourself? When did this last happen? User   22 2133 yes yes yes within the last 24 hours (including today) SS      C-SSRS ([place])    Date and Time Q1 Wished to be Dead (Past Month) Q2 Suicidal Thoughts (Past Month) Q3 Suicidal Thought Method Q4 Suicidal Intent without Specific Plan Q5 Suicide Intent with Specific Plan Q6 Suicide Behavior (Lifetime) Within the Past 3 Months? RETIRED: Level of Risk per Screen Screening Not Complete User   226 yes yes yes -- yes yes -- -- -- SS                  Reviewed:  I reviewed nursing notes, vitals, past medical history and Care Everywhere    Assessments:   I obtained history and examined the patient as noted above.       Disposition:   Patient transferred empath unit    Impression & Plan      Medical Decision Makin year old female presenting w/ reported suicide attempt, alcohol intoxication     DDx includes psychiatric disorder NOS, personality disorder NOS, alcohol abuse, alcohol dependence, major depressive disorder, generalized anxiety disorder, adjustment disorder.  Labs significant for elevated alcohol level, carbon oxide level within safe range.  Given the patient's history and symptomatology, I think would be appropriate to have them evaluated in the empath unit for further evaluation  management.  Patient was transferred to empath unit in stable condition after labs resulted.  She was counseled on the plan prior to transfer empath unit.     Critical Care time:  none    Covid-19  Cyndie Felder was evaluated during a global COVID-19 pandemic, which necessitated consideration that the patient might be at risk for infection with the SARS-CoV-2 virus that causes COVID-19.   Applicable protocols for evaluation were followed during the patient's care.   COVID-19 was considered as part of the patient's evaluation.    Diagnosis:    ICD-10-CM    1. Suicide attempt (H)  T14.91XA    2. Alcoholic intoxication without complication (H)  F10.920    3. Depression, unspecified depression type  F32.A      Discharge Medications:  New Prescriptions    No medications on file     Scribe Disclosure:  Jeannette BORJAS, am serving as a scribe at 10:01 PM on 8/4/2022 to document services personally performed by Nikita Ruvalcaba MD based on my observations and the provider's statements to me.      Nikita Ruvalcaba MD  08/05/22 0103

## 2022-08-05 NOTE — PROGRESS NOTES
"37 year old female with history of depression received from ED due to recently stopping her medications, she notes she was feeling especially sad so she drank. She denied any suicidal ideation or attempt. However, the police department and EMS reports the patient was found in her car running in a closed garage. It was also noted that she had sent text messages to her brother stating \"I am going to kill myself. I want to die\".  Reports she is not suicidal. denies SI/HI. Nursing and risk assessments completed. Assessments reviewed with LMHP and physician. Video monitoring in progress, patient informed.  Admission information reviewed with patient. Patient given a tour of [facility] and instructions on using the facility. Questions regarding [facility] addressed. Pt search completed and belongings inventoried.      "

## 2022-08-05 NOTE — ED NOTES
Patient requested larger scrub pants that were provided to her and she also asked for a glass of water.  Provided patient with a pitcher of ice water and a glass of ice water.

## 2022-08-05 NOTE — ED NOTES
Bed: ED18  Expected date:   Expected time:   Means of arrival:   Comments:  Hems 429/ 37f/ Suicide attempt/ restrained/ Hold ETA 2115

## 2022-08-05 NOTE — ED TRIAGE NOTES
Patient BIBA. Patient sent text messages to brother saying that she wanted to kill herself. Sent pictures of herself in the garage with her car running. Brother called PD who pulled her out of the garage. Children were in the house with no other adult. Patient appears intoxicated. For EMS patient began more agitated and EMS needed to restrain the patient. Patient arrives on peace hold.

## 2022-08-05 NOTE — ED NOTES
"Writer was talking to patient about what happened tonight. Patient states, \"I wasn't trying to kill myself. I just get depressed sometimes.\"  "

## 2022-08-05 NOTE — ED PROVIDER NOTES
"[facility] Unit - Initial Psychiatric Observation Note  [facility] Emergency Department  Observation Initiation Date: Aug 4, 2022    Cyndie Felder MRN: [id number]   Age: 37 year old YOB: 1985     History     Chief Complaint   Patient presents with     Suicidal     HPI  Cyndie Felder is a 37 year old female with a past history notable for major depressive disorder, alcoholism, and anxiety who presents to the ED post-suicide attempt by carbon monoxide poisoning.  Patient was evaluated by the ED provider, who medically cleared patient to transfer to [facility] for psychiatric assessment, this is reviewed along with all pertinent labs and tests performed. Patient placed on observation status. No acute issues overnight. She is being psychiatrically assessed today, 08/05/22.    On examination, patient is tearful, expressing remorse for her actions. She states she wants to be alive for her family yet in the moment yesterday, due to alcohol intoxication, with a blood alcohol level of 0.24 on arrival to the ED, she acted on her suicidal thoughts. She describes feeling sad prior to drinking. She identifies depressive symptoms increasing over the past two months due to increased stress with financial issues. She states she is unable to afford her medication or attend therapy due to the cost.  She has not been consistently taking her medication. Her alcohol consumption has increased during this time with reports of drinking large amounts at a time with periods of sobriety between episodes. She identifies being an \"alcoholic\" and attended CD treatment in November 2021. She stopped attending  AA meetings.    We discuss the seriousness of her suicide attempt as patient claims she regrets it and would like to go home.  Refreshed patient's memory of the documented facts, that she left her children in the house and sat in a running car with the garage door closed.  Patient states she was in the car for about 3 minutes before " sending a text message to her brother with a picture of her sitting in the car.  Patient notes she figured he would call for help, so part of her was reaching out for support. She states she wanted to call and talk to her brother earlier in the day and feels if she would have reached out, she most likely would not have acted on her suicidal thoughts.      We discussed her medications. She has been taking Wellbutrin and Zoloft for a couple years. She feels the Zoloft may not be alleviating her symptoms of anxiety and is interested in pursuing alternative medications.  Patient is requesting to return home today, although given the severity of her suicide act, at least observation status is recommended. It was explained to her she would meet criteria for an involuntary hold, thus she chose to stay voluntary and be reassessed tomorrow to determine further disposition.    Past Medical History  Past Medical History:   Diagnosis Date     Anxiety      Depressive disorder      Diabetes (H)     gestational diabetes     No past surgical history on file.  buPROPion (WELLBUTRIN XL) 300 MG 24 hr tablet  sertraline (ZOLOFT) 100 MG tablet      Allergies   Allergen Reactions     Latex Hives     PN: Converted from LW Latex Sensitivity Flag       Family History  Family History   Problem Relation Age of Onset     Hypertension Father      Social History   Social History     Tobacco Use     Smoking status: Never Smoker     Smokeless tobacco: Never Used   Substance Use Topics     Alcohol use: Not Currently     Drug use: Never      Past medical history, past surgical history, medications, allergies, family history, and social history were reviewed with the patient. No additional pertinent items.       Review of Systems  A complete review of systems was performed with pertinent positives and negatives noted in the HPI, and all other systems negative.    Physical Examination   BP: (!) 138/93  Pulse: 98  Temp: 98.1  F (36.7  C)  Resp:  "18  Height: 160 cm (5' 3\")  Weight: 97.5 kg (215 lb)  SpO2: 96 %    Physical Exam  General: Appears stated age.   Neuro: Alert and fully oriented. Extremities appear to demonstrate normal strength on visual inspection.   Integumentary/Skin: no rash visualized, normal color    Psychiatric Examination   Appearance: awake, alert, fatigued and mild distress  Attitude:  cooperative  Eye Contact:  good  Mood:  anxious, depressed and better  Affect:  dysphoric, overwhelmed and remorseful  Speech:  clear, coherent  Psychomotor Behavior:  no evidence of tardive dyskinesia, dystonia, or tics  Thought Process:  logical, linear and goal oriented  Associations:  no loose associations  Thought Content:  no evidence of suicidal ideation or homicidal ideation and no evidence of psychotic thought  Insight:  fair  Judgement:  intact  Oriented to:  time, person, and place  Attention Span and Concentration:  intact  Recent and Remote Memory:  intact  Language: able to name/identify objects without impairment  Fund of Knowledge: intact with awareness of current and past events    ED Course        Labs Ordered and Resulted from Time of ED Arrival to Time of ED Departure   COMPREHENSIVE METABOLIC PANEL - Abnormal       Result Value    Sodium 141      Potassium 3.6      Chloride 107      Carbon Dioxide (CO2) 26      Anion Gap 8      Urea Nitrogen 14      Creatinine 0.93      Calcium 8.7      Glucose 154 (*)     Alkaline Phosphatase 106      AST 28      ALT 37      Protein Total 7.3      Albumin 3.3 (*)     Bilirubin Total 0.2      GFR Estimate 80     ETHYL ALCOHOL LEVEL - Abnormal    Alcohol ethyl 0.24 (*)    ACETAMINOPHEN LEVEL - Abnormal    Acetaminophen <2 (*)    CBC WITH PLATELETS AND DIFFERENTIAL - Abnormal    WBC Count 10.0      RBC Count 5.26 (*)     Hemoglobin 15.6      Hematocrit 47.7 (*)     MCV 91      MCH 29.7      MCHC 32.7      RDW 13.6      Platelet Count 444      % Neutrophils 62      % Lymphocytes 31      % Monocytes 5   "    % Eosinophils 1      % Basophils 1      % Immature Granulocytes 0      NRBCs per 100 WBC 0      Absolute Neutrophils 6.2      Absolute Lymphocytes 3.1      Absolute Monocytes 0.5      Absolute Eosinophils 0.1      Absolute Basophils 0.1      Absolute Immature Granulocytes 0.0      Absolute NRBCs 0.0     COVID-19 VIRUS (CORONAVIRUS) BY PCR - Normal    SARS CoV2 PCR Negative     CARBON MONOXIDE - Normal    Carbon Monoxide 0.9     SALICYLATE LEVEL - Normal    Salicylate <2     ISTAT HCG QUALITATIVE PREGNANCY POCT - Normal    HCG Qualitative POCT Negative         Assessments & Plan (with Medical Decision Making)   Patient presenting with suicide attempt by carbon monoxide  poisoning interrpted by EMS after she sent a text message to her brother stating what she was doing in the context of MDD further complicated by alcohol consumption.    ursing notes reviewed noting no acute issues.     I have reviewed the assessment completed by the [facility].     During the observation period, the patient did not require medications for agitation, and did not require restraints/seclusion for patient and/or provider safety.     The patient was found to have a psychiatric condition that would benefit from an observation stay in the emergency department for further psychiatric stabilization and/or coordination of a safe disposition. The observation plan includes serial assessments of psychiatric condition, potential administration of medications if indicated, further disposition pending the patient's psychiatric course during the monitoring period.     Preliminary diagnosis:    ICD-10-CM    1. Suicide attempt (H)  T14.91XA    2. Severe episode of recurrent major depressive disorder, without psychotic features (H)  F33.2    3. Acute alcoholic intoxication in alcoholism with blood level of 0.08 to 0.29 without complication (H)  F10.220         Treatment Plan:  -Observation status for further crisis stabilization and medication  management.  -Do not anticipate patient will experience symptoms of withdrawal, thus will not order CIWA protocol.  -Medication changes include:  Decrease Wellbutrin XL from 300 mg daily to 150 mg daily as she wonders if the higher dose makes her anxiety worse.  -Plan to wean off Zoloft while starting Prozac 10 mg daily with plan to increase to 20 mg at time of discharge.  -Gabapentin 100 mg TID for anxiety  -Trazodone 50 mg at HS PRN sleep  -Atarax 25-50 mg PRN anxiety  -Restart Naltrexone 50 mg tomorrow for alcohol abuse.  -Medication education provided this visit includes, rationale for medication, importance of compliance, medication interactions, and common side effects. Patient agreeable.  -Interested in resuming therapy and psychiatry and AA. Is not seeking further CD treatment.    --  GENEVA Rob CNP   [facility] EMERGENCY DEPT  [facility] Unit  8/4/2022        Saira Charlton APRN CNP  08/05/22 [id number]       Saira Charlton APRN CNP  08/20/22 3599

## 2022-08-05 NOTE — CONSULTS
8/4/2022  Cyndie Felder 1985     Diagnostic Evaluation Consultation  Crisis Assessment    Patient was assessed: In Person  Patient location: [place]  Was a release of information signed: No. Reason: pt refused      Referral Data and Chief Complaint  Cyndie is a 37 year old, who uses she/her pronouns, and presents to the ED via EMS. Patient is referred to the ED by family/friends. Patient is presenting to the ED for the following concerns: suicidal ideation      Informed Consent and Assessment Methods     Patient is her own guardian. Writer met with patient and explained the crisis assessment process, including applicable information disclosures and limits to confidentiality, assessed understanding of the process, and obtained consent to proceed with the assessment. Patient was observed to be able to participate in the assessment as evidenced by alert and oriented. Assessment methods included conducting a formal interview with patient, review of medical records, collaboration with medical staff, and obtaining relevant collateral information from family and community providers when available..     Over the course of this crisis assessment provided reassurance, offered validation and engaged patient in problem solving and disposition planning. Patient's response to interventions was appropriate, pt was able to calm self during assessment      Summary of Patient Situation    - What is the patient's presentation and history of crisis/emergency services:    Cyndie Felder is a 37 year old female with a history of depression and anxiety who presents to the emergency department for evaluation of suicidal ideation. The patient recently stopped taking her depression medication and seeing her psychiatrist. Tonight, she notes she was feeling especially sad so she drank. She denied any suicidal ideation or attempt. However, the police department and EMS reports the patient was found in her car running in a closed  "farrah. It was also noted that she had sent text messages to her brother stating \"I am going to kill myself. I want to die\". She also sent pictures to her brother of her sitting in the car in the garage. He called EMS, and they transported her to the emergency department. Here, she denies any suicidal ideation or homicidal thoughts. She also notes she feels emotionally and physically safe at home. She also denied any auditory or visual hallucinations.     Cyndie denies the circumstances for her ED admission was a suicide attempt. She states that she reached out to family, 'they got worried and here I am.' She states that her brother called 911. Pt states that she had been drinking all day and got sad. Pt states that a stressor for her currently is money. Pt states that she sent a text because she wanted to talk with someone, 'my family or . I made a mistake.' Pt states that she stopped taking her medications 4 days ago. 'I kept forgetting.'     Pt became irritable with the assessment and being asked questions. She stated that she did not want to answer anymore questions and didn't know why she couldn't leave. Pt continued to state that her actions were not a suicide attempt. Pt then stated that she was disappointed in herself because she's here. She stated that she would answer the rest of the questions. Pt answered most questions, however, appeared to be vague and/or did not want to answer. She stated that she would finish so that she wouldn't have to in the hospital longer.     -Frequency and prevalance of chief complaint: unknown      -Clinical description of symptoms: pt stated that she did not want to share. Pt appeared to be sad and tearful throughout assessment.     -Duration of the current crisis: 'I guess today'     -Resolution attempts - coping skills - towards mitigating life threatening symptoms: 'going to bed or walk'        Brief Psychosocial History    - Current living situation:  and kids " (13, 12, 6). Pt states she thinks her  is home with the kids right now. When asked if her kids were at home while she was in the car in the garage, pt would not answer.       - Brief family history: 'no'      - School/ Work Functioning:  'no'     - Employment and income source - financial concerns: pt states there are some financial concerns     -  status: unknown      - Hobbies: unknown     - Supports: 'next question'     - Relevant legal issues: NA    - Cultural, Jehovah's witness, or spiritual influences on mental health care: unknown     Significant Clinical History    - History of mental health (and) substance use crisis: depression and anxiety, ADHD     Pt states that she started drinking again a month ago. She states that she had been sober for 6 or 7 months and then drinking again. Pt states that she usually has been drinking a couple of beers each day, however, she states that she had the day off today and ended up drinking more.      - Symptom and diagnosis history: pt states she's not able to provide symptoms that she experiences related to depression and anxiety       - Historic treatment team: pt states that she hasn't seen a therapist in a while due to financial concerns. She states that her PCP manages her medications.     - Past hospitalization, commitments, [place], treatment programs, and other therapuetic efforts: pt denies    - Relevant trauma history: 'no'       Collateral Information    The following information was received from Tru whose relationship to the patient is spouse. Information was obtained via phone. Their phone number is [phone] and they last had contact with patient on 8/4/22.    What happened today: Tru states that he works nights (leaves at 4pm) and was gone when everything happened. He states that pt wants him to get on day shift, which he states is understandable, but he can't switch until the school year starts. He states that his daughter  called him at work and said  were at their home. Tru states that he is aware that pt was texting her brother that she was threatening suicide and he called the . Tru confirms that his kids were at home during this.    What is different about patient's functioning: 'well she is supposed to work today and she's not here'    Concern about alcohol/drug use: 'not really, I wasn't here.' Writer then asked if he had concerns with any substance use recently. Tru states that she went to treatment a year ago because her drinking got out of control. He states that 'when her birthday hit, she let her hair down.'     What do you think the patient needs: 'a hug.' Tru states that he knows she wants him to be home at night. He states that this incident was isolated, she hasn't done anything like this before and he doesn't know why she did it.     Has patient made comments about wanting to kill themselves/others:  No     If d/c is recommended, can they take part in safety/aftercare planning: Yes, Tru states that he feels comfortable with her coming home. He does not have concerns regarding suicidal thoughts/plan     Other information:     Risk Assessment  ESS-6  1.a. Over the past 2 weeks, have you had thoughts of killing yourself? No pt denies, however, per EMS pt was found at her home in her car (running) with the garage closed.  1.b. Have you ever attempted to kill yourself and, if yes, when did this last happen? No pt denies, however, per EMS pt was found at her home in her car (running) with the garage closed.  2. Recent or current suicide plan? No   3. Recent or current intent to act on ideation? No  4. Lifetime psychiatric hospitalization? No  5. Pattern of excessive substance use? Yes  6. Current irritability, agitation, or aggression? Yes  Scoring note: BOTH 1a and 1b must be yes for it to score 1 point, if both are not yes it is zero. All others are 1 point per number. If all questions 1a/1b - 6  are no, risk is negligible. If one of 1a/1b is yes, then risk is mild. If either question 2 or 3, but not both, is yes, then risk is automatically moderate regardless of total score. If both 2 and 3 are yes, risk is automatically high regardless of total score.      Score: 2, high risk      Does the patient have access to lethal means? No     Does the patient engage in non-suicidal self-injurious behavior (NSSI/SIB)? no     Does the patient have thoughts of harming others? No     Is the patient engaging in sexually inappropriate behavior?  no        Current Substance Abuse     Is there recent substance abuse?     Pt states that she started drinking again a month ago. She states that she had been sober for 6 or 7 months and then drinking again. Pt states that she usually has been drinking a couple of beers each day, however, she states that she had the day off today and ended up drinking more.      Was a urine drug screen or blood alcohol level obtained: Yes BAL .24       Mental Status Exam     Affect: Appropriate   Appearance: Appropriate    Attention Span/Concentration: Attentive  Eye Contact: Engaged   Fund of Knowledge: Appropriate    Language /Speech Content: Fluent   Language /Speech Volume: Normal    Language /Speech Rate/Productions: Normal    Recent Memory: Intact   Remote Memory: Intact   Mood: Irritable    Orientation to Person: Yes    Orientation to Place: Yes   Orientation to Time of Day: Yes    Orientation to Date: Yes    Situation (Do they understand why they are here?): Yes    Psychomotor Behavior: Normal    Thought Content: Clear   Thought Form: Intact      History of commitment: No       Medication    Psychotropic medications: See below    Current Facility-Administered Medications   Medication     acetaminophen (TYLENOL) tablet 650 mg     Current Outpatient Medications   Medication     buPROPion (WELLBUTRIN XL) 300 MG 24 hr tablet     sertraline (ZOLOFT) 100 MG tablet       Medication changes made  in the last two weeks: No: pt does report that she stopped taking her medications about 4 days ago because she forgot        Current Care Team    Primary Care Provider: Ilene Joe MD  Psychiatrist: No  Therapist: No  : No     CTSS or ARMHS: No  ACT Team: No  Other: No      Diagnosis    311 (F32.9) Unspecified Depressive Disorder        Clinical Summary and Substantiation of Recommendations    The following therapeutic methodologies were employed when working with the patient: establishing rapport, active listening, assessing dimensions of crisis, identifying additional supports and alternative coping skills, safety planning, psychoeducation, motivational interviewing, brief supportive therapy and treatment planning. Patient response to intervention: pt appeared irritable and did not want to participate in assessment at times. Pt stated that she was irritated at herself for getting into this situation.   Pt is placed on observation status. Pt will be monitored for mental health concerns and reassess in the AM to address severity of mental health symptoms and discuss potential services that may be beneficial and appropriate. Pt reports feeling safe and denies SI/HI while on the unit.     Disposition    Recommended disposition: Individual Therapy, Medication Management and Other: Pt is placed on observation status. Pt will be monitored for mental health concerns and reassess in the AM to address severity of mental health symptoms and discuss potential services that may be beneficial and appropriate.       Reviewed case and recommendations with attending provider. Attending Name: Consult is still in?process at the time of writing this note.?Information from this assessment will be?communicated to the attending provider.        Attending concurs with disposition: Determination in process, [facility] team will?update as disposition is finalized. See ED notes?for further information.    Patient concurs with  disposition: Yes       Guardian concurs with disposition: NA      Final disposition: Other: Pt is placed on observation status. Pt will be monitored for mental health concerns and reassess in the AM to address severity of mental health symptoms and discuss potential services that may be beneficial and appropriate..     Inpatient Details (if applicable):  Is patient admitted voluntarily:NA      Patient aware of potential for transfer if there is not appropriate placement? NA       Patient is willing to travel outside of the [facility] for placement? NA      Behavioral Intake Notified? NA     Outpatient Details (if applicable):   Aftercare plan and appointments placed in the AVS and provided to patient: No. Rationale: will be provided at time of discharge    Was lethal means counseling provided as a part of aftercare planning? Yes - describe pt states that she does not have access to gun/weapons       Assessment Details    Patient interview started at: 12:50 PM and completed at: 1:10 AM     Total duration spent on the patient case in minutes: .50 hrs      CPT code(s) utilized: 83782 - Psychotherapy for Crisis - 60 (30-74*) min       Mahogany Montanez, KRYSTINAC, LADC   DEC - Triage & Transition Services

## 2022-08-05 NOTE — SAFE
Cyndie Felder  August 5, 2022  SAFE Note    Critical Safety Issues: suicide attempt      Current Suicidal Ideation/Self-Injurious Concerns/Methods: Other pt was found by EMS in her car which was running in a closed garage      Current or Historical Inappropriate Sexual Behavior: No      Current or Historical Aggression/Homicidal Ideation: None - N/A      Triggers: financial stressors    Guardianship Status: Cyndie is her own guardian.. Guardianship paperwork is not required.    This patient is a child/adolescent: No    This patient has additional special visitor precautions: No    Updated care team: Yes: discussed with pts nurse    For additional details see full LMHP assessment.       Mahogany Montanez, LPCC, LADC

## 2022-08-06 VITALS
HEART RATE: 75 BPM | RESPIRATION RATE: 16 BRPM | DIASTOLIC BLOOD PRESSURE: 108 MMHG | TEMPERATURE: 98.9 F | SYSTOLIC BLOOD PRESSURE: 157 MMHG | HEIGHT: 63 IN | WEIGHT: 215 LBS | BODY MASS INDEX: 38.09 KG/M2 | OXYGEN SATURATION: 100 %

## 2022-08-06 PROCEDURE — G0378 HOSPITAL OBSERVATION PER HR: HCPCS

## 2022-08-06 PROCEDURE — 99217 PR OBSERVATION CARE DISCHARGE: CPT | Mod: 25 | Performed by: NURSE PRACTITIONER

## 2022-08-06 PROCEDURE — 250N000013 HC RX MED GY IP 250 OP 250 PS 637: Performed by: NURSE PRACTITIONER

## 2022-08-06 PROCEDURE — 250N000013 HC RX MED GY IP 250 OP 250 PS 637: Performed by: STUDENT IN AN ORGANIZED HEALTH CARE EDUCATION/TRAINING PROGRAM

## 2022-08-06 RX ORDER — BUPROPION HYDROCHLORIDE 150 MG/1
150 TABLET ORAL EVERY MORNING
Qty: 30 TABLET | Refills: 1 | Status: SHIPPED | OUTPATIENT
Start: 2022-08-06

## 2022-08-06 RX ORDER — GABAPENTIN 100 MG/1
100 CAPSULE ORAL 3 TIMES DAILY PRN
Qty: 82 CAPSULE | Refills: 0 | Status: SHIPPED | OUTPATIENT
Start: 2022-08-06

## 2022-08-06 RX ADMIN — GABAPENTIN 100 MG: 100 CAPSULE ORAL at 13:05

## 2022-08-06 RX ADMIN — SERTRALINE HYDROCHLORIDE 100 MG: 100 TABLET ORAL at 08:09

## 2022-08-06 RX ADMIN — NALTREXONE HYDROCHLORIDE 50 MG: 50 TABLET, FILM COATED ORAL at 08:09

## 2022-08-06 RX ADMIN — FLUOXETINE 10 MG: 10 CAPSULE ORAL at 08:09

## 2022-08-06 RX ADMIN — GABAPENTIN 100 MG: 100 CAPSULE ORAL at 08:09

## 2022-08-06 RX ADMIN — ACETAMINOPHEN 650 MG: 325 TABLET ORAL at 10:16

## 2022-08-06 RX ADMIN — BUPROPION HYDROCHLORIDE 150 MG: 150 TABLET, FILM COATED, EXTENDED RELEASE ORAL at 08:09

## 2022-08-06 NOTE — ED PROVIDER NOTES
"[facility] Unit - Psychiatric Observation Discharge Summary  [facility] Emergency Department  Discharge Date: 8/6/2022    Cyndie Felder MRN: [id number]   Age: 37 year old YOB: 1985     Brief HPI & Initial ED Course     Chief Complaint   Patient presents with     Suicidal     HPI  Cyndie Felder is a 37 year old female with history notable for major depressive disorder, alcoholism, and anxiety who presents to the ED post-suicide attempt by carbon monoxide poisoning. Once medically cleared, she was transferred to [facility] where she was psychiatrically assessed by myself.  Medication changes were made. Wellbutrin XL was decreased from 300 mg daily to 150 mg to increasing anxiety, gabapentin 100 mg TID for anxiety started, naltrexone 50 mg started and transition from Zoloft 200 mg daily to Prozac 20 mg was initiated.    Patient is seen for follow up of suicide attempt with worsening anxiety and depression.  Patient reports her mood and outlook on life has improved considerably since admission. She relates this to the therapeutic milieu, emotional support provided, medication changes and obtaining restorative sleep.  Patient is tolerating their medications without adverse side effects.  Describes sleeping well  overnight.  Reports appetite as good.  Denies suicidal thoughts. Continues to feel remorse for her actions. Recognizes it was her mental health communicating she was suffering from severe emotional pain.  We spoke about coping mechanisms and self care activities to engage in after discharge to further her mental health therapy.    Physical Examination   BP: (!) 157/108  Pulse: 75  Temp: 98.9  F (37.2  C)  Resp: 16  Height: 160 cm (5' 3\")  Weight: 97.5 kg (215 lb)  SpO2: 100 %    Physical Exam  General: Appears stated age.   Neuro: Alert and fully oriented. Extremities appear to demonstrate normal strength on visual inspection.   Integumentary/Skin: no rash visualized, normal color    Psychiatric " Examination   Appearance: awake, alert  Attitude:  cooperative  Eye Contact:  good  Mood:  better  Affect:  intensity is normal  Speech:  clear, coherent  Psychomotor Behavior:  no evidence of tardive dyskinesia, dystonia, or tics  Thought Process:  logical, linear and goal oriented  Associations:  no loose associations  Thought Content:  no evidence of suicidal ideation or homicidal ideation and no evidence of psychotic thought  Insight:  good  Judgement:  intact  Oriented to:  time, person, and place  Attention Span and Concentration:  intact  Recent and Remote Memory:  intact  Language: able to name/identify objects without impairment  Fund of Knowledge: intact with awareness of current and past events    Results        Labs Ordered and Resulted from Time of ED Arrival to Time of ED Departure   COMPREHENSIVE METABOLIC PANEL - Abnormal       Result Value    Sodium 141      Potassium 3.6      Chloride 107      Carbon Dioxide (CO2) 26      Anion Gap 8      Urea Nitrogen 14      Creatinine 0.93      Calcium 8.7      Glucose 154 (*)     Alkaline Phosphatase 106      AST 28      ALT 37      Protein Total 7.3      Albumin 3.3 (*)     Bilirubin Total 0.2      GFR Estimate 80     ETHYL ALCOHOL LEVEL - Abnormal    Alcohol ethyl 0.24 (*)    ACETAMINOPHEN LEVEL - Abnormal    Acetaminophen <2 (*)    CBC WITH PLATELETS AND DIFFERENTIAL - Abnormal    WBC Count 10.0      RBC Count 5.26 (*)     Hemoglobin 15.6      Hematocrit 47.7 (*)     MCV 91      MCH 29.7      MCHC 32.7      RDW 13.6      Platelet Count 444      % Neutrophils 62      % Lymphocytes 31      % Monocytes 5      % Eosinophils 1      % Basophils 1      % Immature Granulocytes 0      NRBCs per 100 WBC 0      Absolute Neutrophils 6.2      Absolute Lymphocytes 3.1      Absolute Monocytes 0.5      Absolute Eosinophils 0.1      Absolute Basophils 0.1      Absolute Immature Granulocytes 0.0      Absolute NRBCs 0.0     COVID-19 VIRUS (CORONAVIRUS) BY PCR - Normal    SARS  CoV2 PCR Negative     CARBON MONOXIDE - Normal    Carbon Monoxide 0.9     SALICYLATE LEVEL - Normal    Salicylate <2     ISTAT HCG QUALITATIVE PREGNANCY POCT - Normal    HCG Qualitative POCT Negative         Observation Course   The patient was found to have a psychiatric condition that would benefit from an observation stay in the emergency department for further psychiatric stabilization and/or coordination of a safe disposition. The plan upon observation admission included serial assessments of psychiatric condition, potential administration of medications if indicated, further disposition pending the patient's psychiatric course during the monitoring period.     Serial assessments of the patient's psychiatric condition were performed. Nursing notes were reviewed. During the observation period, the patient did not require medications for agitation, and did not require restraints/seclusion for patient and/or provider safety.     After a period of working with the treatment team on the [facility] unit, the patient's mental state improved to allow a safe transition to outpatient care. After counseling on the diagnosis, work-up, and treatment plan, the patient was discharged. Close follow-up with a psychiatrist and/or therapist was recommended and community psychiatric resources were provided. Patient is to return to the ED if any urgent or potentially life-threatening concerns.     Discharge Diagnoses:   Final diagnoses:   Suicide attempt (H)   Severe episode of recurrent major depressive disorder, without psychotic features (H)   Acute alcoholic intoxication in alcoholism with blood level of 0.08 to 0.29 without complication (H)       Treatment Plan:  -Discharge home with safety plan in place.  -Medication changes as noted above. Prescriptions sent to outpatient pharmacy.  -Medication education provided this visit includes, rationale for medication, importance of compliance, medication interactions, and common side  effects. Patient agreeable.  -Problem focused, supportive therapy provided around patients stressors and symptoms related to their diagnosis.  Validated patients emotions and problems solved with patient around stress management and self care strategies. Patient was receptive.   -Referrals for therapy and psychiatry for mediation management.    At the time of discharge, the patient's acute suicide risk was determined to be low due to the following factors: Reduction in the intensity of mood/anxiety symptoms that preceded the admission, denial of suicidal thoughts, denies feeling helpless or helpless, not currently under the influence of alcohol or illicit substances, denies experiencing command hallucinations, no immediate access to firearms. The patient's acute risk could be higher if noncompliant with their treatment plan, medications, follow-up appointments or using illicit substances or alcohol. Protective factors include: social supports, children, stable housing, employment.    --  GENEVA Rob CNP  [facility] EMERGENCY DEPT  [facility] Unit  8/6/2022      Saira Charlton APRN CNP  08/06/22 153

## 2022-08-06 NOTE — DISCHARGE INSTRUCTIONS
Psychiatry Follow-Up Appointment    Date: Friday, 9/23/2022  Time: 10:00 am - 11:00 am  Provider: Selin Ortega MD, MD  Location: [facility], [address] [place] [address], [address], [place] [address]  Phone: [phone]  Type: Telepsychiatry    Scheduling Instructions  Hello, we are now offering Telehealth appointments. Please make sure patient has a device (smartphone, tablet, computer, etc.) that can handle video calls. Once patient is put on schedule, ask them to fill New Patient Form by using following link: [email or web address] at least 72 hours prior to appointment. Please avoid same day appointments and schedule ATLEAST 72 HOURS ahead of time . Also please note that we ONLY SEE [place] RESIDENTS . NO INTERPRETATION SERVICES AVAILABLE.  Patient Instructions  Please fill New Patient Form by using following link. All forms need to be completed 72 hours prior to the appointment date/time by going to [email or web address] Please call us on [phone] 72 hours prior to your scheduled appointment to confirm that you are able to attend. We will provide you information about how to log into video call software when you call.  Other Information  (none)  Scheduled By: Karie Willett  Scheduled On: 8/6/2022 2:54 pm          Aftercare Plan  If I am feeling unsafe or I am in a crisis, I will:   Contact my established care providers   Call the National Suicide Prevention Lifeline: 988  Go to the nearest emergency room   Call 911     Warning signs that I or other people might notice when a crisis is developing for me: Low self-esteem and lack of engagement in self-care practices.    Things I am able to do on my own to cope or help me feel better: Spend time by myself in nature.     Things that I am able to do with others to cope or help me better: Spend time cooking with family and having game nights.     Things I can use or do for distraction: Spend time with kids and watching movies.  "    Changes I can make to support my mental health and wellness: Prioritize self-care habits and building a routine.     People in my life that I can ask for help:  and family members.     Your [facility] has a mental health crisis team you can call 24/7: [facility] Mobile Crisis  [phone]     Other things that are important when I'm in crisis: Recognizing my growth and the positive side of things in life.     Additional resources and information: AA groups, sponsor, outpatient therapy, EAP through workplace.        Crisis Lines  Crisis Text Line  Text 123327  You will be connected with a trained live crisis counselor to provide support.    Por espanol, texto  TAWANA a 289750 o texto a 442-AYUDAME en WhatsApp    [employer] (LGBTQ Youth Crisis Line)  1.461.041.4583  text START to 209-249      Community Resources  Fast Tracker  Linking people to mental health and substance use disorder resources  [employer] Resources     [place] Mental Health Warm Line  Peer to peer support  Monday thru Saturday, 12 pm to 10 pm  689.240.0514 or 9.721.364.4512  Text \"Support\" to 52547    [employer] [place] [employer] Mental [employer]ness (BRYANT)  709.567.5818 or 1.888.BRYANT.HELPS      Mental Health Apps  My3  https://myJuesheng.compp.org/    VirtualHopeBox  https://4INFO.org/apps/virtual-hope-box/      Additional Information  Today you were seen by a licensed mental health professional through Triage and Transition services, [facility])  for a crisis assessment in the Emergency Department at [facility].  It is recommended that you follow up with your established providers (psychiatrist, mental health therapist, and/or primary care doctor - as relevant) as soon as possible. Coordinators from [facility] will be calling you in the next 24-48 hours to ensure that you have the resources you need.  You can also contact P coordinators directly at [phone]. You may have been scheduled for or offered " an appointment with a mental health provider. [facility] maintains an extensive network of licensed behavioral health providers to connect patients with the services they need.  We do not charge providers a fee to participate in our referral network.  We match patients with providers based on a patient's specific needs, insurance coverage, and location.  Our first effort will be to refer you to a provider within your care system, and will utilize providers outside your care system as needed.      Outpatient Therapy Resources    [place] [address]  [phone]    2. [facility]  [phone]     Medication Instructions:  Take 3 more doses of Zoloft 50 mg daily then stop.  May continue to take Gabapentin scheduled three times a day or take it as needed for anxiety.

## 2022-08-06 NOTE — CONSULTS
[facility] Crisis Reassessment      Cyndie Felder was reassessed at the request of patient and tx team for the following reasons: assessment for discharge needs. Pt was first seen on 8/5/2022 by Mahogany Montanez, TREE, CIARAC ; see the initial assessment note for details.    Patient Presentation    Initial ED presentation details: Patient admitted to the ED on 8/4/2022 for concerns related to suicidal ideation and sitting in her car in the garage at home with it running. Patient texted her brother a concerning text that exhibited suicidal ideation and he called EMS. Upon initial assessment, patient appeared irritable and agitated, refusing to answer some questions during the assessment as well as reporting that sitting in her vehicle was not a suicide attempt. Upon arrival, patient's HOMA was at 0.24.    Current patient presentation: Writer met with patient in the [facility] unit and introduced self and rationale for the reassessment. Patient was pleasant, cooperative, engaged and oriented x4. Patient denied SI/SIB/HI. Patient reported she has struggled with low self-esteem and image issues as well as has stopped taking her medications and has increased alcohol use for about the past month. Patient reported that she has been wanting to work on establishing better coping skills and her current skills include taking walks on her break at work and watching movies. Writer provided psychoeducation about other coping skills such as spending time with family, daily routine building and eating balanced meals as a way to engage in self-care. Patient receptive to this. Patient and writer further discussed desires for wellness and a safety plan. Patient agreeable to utilizing healthy coping skills, obtaining outpatient supports (individual psychotherapy) and connecting with her AA community and sponsor. Patient also reported having access to an EAP through her job and wanting to look into this independently as well.    Changes observed since  "initial assessment: Patient reported \"feeling really good,\" since the ED admission and receptive to her medication changes after her psych consult. Patient also reported she had the time to reflect on her life and the changes she wanted to make, which she was unable to do prior to coming to [facility].      Risk of Harm    Is the patient experiencing current suicidal ideation: No    Does the patient have thoughts of harming others? No      Mental Status Exam   Affect: Appropriate   Appearance: Appropriate    Attention Span/Concentration: Attentive?    Eye Contact: Engaged   Fund of Knowledge: Appropriate    Language /Speech Content: Fluent   Language /Speech Volume: Normal    Language /Speech Rate/Productions: Normal    Recent Memory: Intact   Remote Memory: Intact   Mood: Normal    Orientation to Person: Yes    Orientation to Place: Yes   Orientation to Time of Day: Yes    Orientation to Date: Yes    Situation (Do they understand why they are here?): Yes    Psychomotor Behavior: Normal    Thought Content: Clear   Thought Form: Intact       Additional Collateral Information   Writer outreached patient's spouse, Tru, at [phone] to discuss safety planning and reassessment. Tru denied any concerns with patient returning home and was agreeable to discharge orders and instructions.      Therapeutic Intervention  The following therapeutic methodologies were employed when working with the patient: Establishing rapport, Active listening, Assess dimensions of crisis, Establish a discharge plan and Safety planning. Patient response to intervention: receptive, cooperative and engaged.      Disposition  Recommended disposition: Other: Outpatient      Reviewed case and recommendations with attending provider. Attending Name: GENEVA Alvarado CNP      Attending concurs with disposition: Yes      Patient concurs with disposition: Yes      Final disposition: Other: Outpatient.       Clinical Substantiation of " Recommendations  Patient admitted to the ED on 8/4/2022 for suicidal ideation. Since admitted, patient reported symptoms stabilizing and was receptive to outpatient supports, safety and discharge planning. Patient denied SI/SIB/HI and expressed a desire to engage in outpatient and community based supports as well as connect with her AA support groups and sponsor. Since admission, patient received medication changes and was agreeable to medication adherence.     Based on the above narrative, it is writer's clinical opinion patient's mental health and substance use needs meet criteria for outpatient supports and that her symptoms do not indicate a risk of harm or criteria for inpatient admission.      Assessment Details  Total duration spent on the patient case in minutes: .50 hrs     CPT code(s) utilized: 35980 - Psychotherapy (with patient) - 30 (16-37*) min       ASMITA GRIGSBY, LGSW, [facility]

## 2022-08-06 NOTE — PROGRESS NOTES
Discharge instructions reviewed with patient including follow-up care plan. Educated on medication regime and advised not to stop prescribed medication without consulting their physician. Reviewed safety plan and outpatient resources.Denies SI. All belongings which where brought into the hospital have been returned to patient. Escorted off the unit at 15:45   accompanied by staff. Discharged to home and picked up by her .

## 2023-04-23 ENCOUNTER — HEALTH MAINTENANCE LETTER (OUTPATIENT)
Age: 38
End: 2023-04-23

## 2024-02-11 ENCOUNTER — HEALTH MAINTENANCE LETTER (OUTPATIENT)
Age: 39
End: 2024-02-11

## 2024-06-30 ENCOUNTER — HEALTH MAINTENANCE LETTER (OUTPATIENT)
Age: 39
End: 2024-06-30

## 2025-07-13 ENCOUNTER — HEALTH MAINTENANCE LETTER (OUTPATIENT)
Age: 40
End: 2025-07-13